# Patient Record
Sex: MALE | Race: WHITE | NOT HISPANIC OR LATINO | URBAN - METROPOLITAN AREA
[De-identification: names, ages, dates, MRNs, and addresses within clinical notes are randomized per-mention and may not be internally consistent; named-entity substitution may affect disease eponyms.]

---

## 2024-10-13 ENCOUNTER — EMERGENCY (EMERGENCY)
Facility: HOSPITAL | Age: 24
LOS: 0 days | Discharge: HOME | End: 2024-10-13
Attending: EMERGENCY MEDICINE
Payer: COMMERCIAL

## 2024-10-13 VITALS
SYSTOLIC BLOOD PRESSURE: 137 MMHG | WEIGHT: 199.96 LBS | DIASTOLIC BLOOD PRESSURE: 83 MMHG | HEART RATE: 58 BPM | HEIGHT: 71 IN | TEMPERATURE: 97 F | RESPIRATION RATE: 18 BRPM | OXYGEN SATURATION: 100 %

## 2024-10-13 VITALS
DIASTOLIC BLOOD PRESSURE: 81 MMHG | OXYGEN SATURATION: 100 % | RESPIRATION RATE: 18 BRPM | HEART RATE: 64 BPM | TEMPERATURE: 98 F | SYSTOLIC BLOOD PRESSURE: 128 MMHG
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
## 2024-10-13 DIAGNOSIS — R55 SYNCOPE AND COLLAPSE: ICD-10-CM

## 2024-10-13 DIAGNOSIS — T78.40XA ALLERGY, UNSPECIFIED, INITIAL ENCOUNTER: ICD-10-CM
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
## 2024-10-13 DIAGNOSIS — R09.89 OTHER SPECIFIED SYMPTOMS AND SIGNS INVOLVING THE CIRCULATORY AND RESPIRATORY SYSTEMS: ICD-10-CM

## 2024-10-13 PROCEDURE — 93005 ELECTROCARDIOGRAM TRACING: CPT

## 2024-10-13 PROCEDURE — 99284 EMERGENCY DEPT VISIT MOD MDM: CPT

## 2024-10-13 PROCEDURE — 96374 THER/PROPH/DIAG INJ IV PUSH: CPT

## 2024-10-13 PROCEDURE — 96375 TX/PRO/DX INJ NEW DRUG ADDON: CPT

## 2024-10-13 PROCEDURE — 93010 ELECTROCARDIOGRAM REPORT: CPT

## 2024-10-13 PROCEDURE — 99284 EMERGENCY DEPT VISIT MOD MDM: CPT | Mod: 25

## 2024-10-13 RX ORDER — METHYLPREDNISOLONE ACETATE 80 MG/ML
125 INJECTION, SUSPENSION INTRA-ARTICULAR; INTRALESIONAL; INTRAMUSCULAR; SOFT TISSUE ONCE
Refills: 0 | Status: COMPLETED | OUTPATIENT
Start: 2024-10-13 | End: 2024-10-13

## 2024-10-13 RX ORDER — DIPHENHYDRAMINE HCL 12.5MG/5ML
25 LIQUID (ML) ORAL ONCE
Refills: 0 | Status: COMPLETED | OUTPATIENT
Start: 2024-10-13 | End: 2024-10-13

## 2024-10-13 RX ORDER — FAMOTIDINE 40 MG
20 TABLET ORAL ONCE
Refills: 0 | Status: COMPLETED | OUTPATIENT
Start: 2024-10-13 | End: 2024-10-13

## 2024-10-13 RX ADMIN — METHYLPREDNISOLONE ACETATE 125 MILLIGRAM(S): 80 INJECTION, SUSPENSION INTRA-ARTICULAR; INTRALESIONAL; INTRAMUSCULAR; SOFT TISSUE at 15:28

## 2024-10-13 RX ADMIN — Medication 25 MILLIGRAM(S): at 16:04

## 2024-10-13 RX ADMIN — Medication 20 MILLIGRAM(S): at 15:28

## 2024-10-13 NOTE — ED ADULT NURSE NOTE - OBJECTIVE STATEMENT
pt presents to ER for allergic reaction to a bee sting, family at bedside. girlfriend reports pt was stung, and within 20 minutes passed out. states "there was a bystander near by who had an epi pen so I administered it, and he woke up". pt reports he received benadryl en route. states "I just feel really weak and tired now". reports being stung in August for the first time and had no reaction. denies any other medical complaints.

## 2024-10-13 NOTE — ED PROVIDER NOTE - NS ED ROS FT
GEN: Denies fever, chills, sick contacts, recent travel  HEENT: +sensation of throat closing Denies dizziness, blurry vision, HA  Cardiac: +syncope  Denies CP, SOB, palpitations  Lungs: Denies cough, wheezing  PV: Denies LE swelling  GI: Denies nausea, vomiting, diarrhea, constipation, flank pain  : Denies hematuria, dysuria, frequency, urgency  Skin: Denies rash, redness, open wound  MSK: Denies pain, decreased ROM  Neuro: Denies dizziness, difficulty speaking, difficulty swallowing, numbness/tingling in extremities, loss of bowel/bladder control, weakness in extremities

## 2024-10-13 NOTE — ED PROVIDER NOTE - PATIENT PORTAL LINK FT
You can access the FollowMyHealth Patient Portal offered by [facility] by registering at the following website: [email or web address]. By joining [facility]’s FollowMyHealth portal, you will also be able to view your health information using other applications (apps) compatible with our system.

## 2024-10-13 NOTE — ED ADULT TRIAGE NOTE - CHIEF COMPLAINT QUOTE
brought in by EMS s/p syncope / suspected anaphylaxis s/p having lunch outside. he was stung by a bee, girlfriend decided to bring him to [facility] because he was not feeling well. he passed out in the parking lot of urgent care; a bystander had epinephrine Autoinjector on her and gave it to his girlfriend who administered 0.3 mg epi IM to thigh because he appeared swollen.  upon EMS arrival, patient was given 50 mg IV Benadryl. upon arrival to ED, patient airway is patient, breathing spontaneously and unlabored. states he feels much better than he was.

## 2024-10-13 NOTE — ED PROVIDER NOTE - CLINICAL SUMMARY MEDICAL DECISION MAKING FREE TEXT BOX
25 y/o M present with likely anaphylaxis following bee sting. Symptoms improved with epipen provided prior to arrival. WIll observe on cardiac monitor, assess EKG, provide antihistamines and steroids.

## 2024-10-13 NOTE — ED PROVIDER NOTE - OBJECTIVE STATEMENT
23 y/o M with no reported PMH presents with concern for allergic reaction to bee sting today. Pt was with his girlfriend eating lunch when he was stung by a bee. States he's been stung previously with no abnormal reactions. Was enroute to [facility], reported 5 minutes away, when patient passed out in the parking lot. Girlfriend states his throat started to swell and he appeared pale. Bystander had epipen which girlfriend administered. Sates patient awoke immediately. EMS was activated and patient transported to ED. Pt provided 50mg benadryl by EMS. As per krystal, events occurred less than 1 hour prior to this evaluation. Pt states he feels drowsy at this time, otherwise feels well. He admits to sensation of his throat closing prior to passing out.

## 2024-10-13 NOTE — ED PROVIDER NOTE - PHYSICAL EXAMINATION
Gen: Well appearing. A&O x3.   HEENT: NC/AT. Dentition in good repair. No oropharyngeal erythema, tonsilar enlargement or exudates. Uvula midline. No submandibular or anterior cervical lymphadenopathy. Nares patent.  Cardiac: s1s2, RRR.  Lungs: CTAB, no chest wall tenderness.  Abdomen: NBS x4, soft, nontender.   MSK: No obvious deformity to extremities. No midline spinal tenderness or tenderness over bony landmarks on extremities. 5/5 upper and lower extremity strength. Full ROM in all extremities  Neuro: CN II-XII intact. Sensation intact to light touch in all extremities. 5/5 strength in all extremities.   PV: No LE edema or calf tenderness. Distal pulses 2+ in all extremities.

## 2024-10-13 NOTE — ED PROVIDER NOTE - ATTENDING APP SHARED VISIT CONTRIBUTION OF CARE
I personally saw the patient with the PA, and completed the key components of the history and physical exam. I then discussed the management plan with the PA.   gen in nad no drooling no stridor resp clear cardiac no murmur abd soft neuro intact  has access to get epi pen at pharmacy currently checked with pharmacy  all family at bedside agree to plan for dispo   ekg no ischemia nml intervals   d/w pt need to carry epi pen at all times

## 2024-10-13 NOTE — ED ADULT NURSE NOTE - CHIEF COMPLAINT QUOTE
brought in by EMS s/p syncope / suspected anaphylaxis s/p having lunch outside. he was stung by a bee, girlfriend decided to bring him to [facility] because he was not feeling well. he passed out in the parking lot of urgent care; a bystander had epinephrine Autoinjector on her and gave it to his girlfriend who administered 0.3 mg epi IM to thigh because he appeared swollen.  upon EMS arrival, patient was given 50 mg IV Benadryl. upon arrival to ED, patient airway is patient, breathing spontaneously and unlabored. states he feels much better than he was.